# Patient Record
Sex: FEMALE | Race: WHITE | Employment: PART TIME | URBAN - METROPOLITAN AREA
[De-identification: names, ages, dates, MRNs, and addresses within clinical notes are randomized per-mention and may not be internally consistent; named-entity substitution may affect disease eponyms.]

---

## 2020-02-11 ENCOUNTER — OFFICE VISIT (OUTPATIENT)
Dept: FAMILY MEDICINE CLINIC | Age: 55
End: 2020-02-11

## 2020-02-11 VITALS
HEART RATE: 71 BPM | HEIGHT: 63 IN | BODY MASS INDEX: 26.4 KG/M2 | DIASTOLIC BLOOD PRESSURE: 80 MMHG | OXYGEN SATURATION: 96 % | RESPIRATION RATE: 14 BRPM | WEIGHT: 149 LBS | SYSTOLIC BLOOD PRESSURE: 124 MMHG | TEMPERATURE: 98.9 F

## 2020-02-11 PROCEDURE — 99213 OFFICE O/P EST LOW 20 MIN: CPT | Performed by: PHYSICIAN ASSISTANT

## 2020-02-11 RX ORDER — METHYLPREDNISOLONE 4 MG/1
TABLET ORAL
Qty: 1 KIT | Refills: 0 | Status: SHIPPED | OUTPATIENT
Start: 2020-02-11 | End: 2020-02-17

## 2020-02-11 NOTE — PROGRESS NOTES
Chief Complaint:   Rash (on face, since a year ago)    History of Present Illness   Source of history provided by:  patient. Christine Davila is a 47 y.o. old female who has a past medical history of: No past medical history on file. Presents to the walk in clinic for evaluation of a rash to the neck and face, which pt first noticed approximately a year ago. She states that the rash has been present on and off during that time. She was given Lotrisone which she states resolves the issue until she stops the cream. Nothing else makes it better or worse. Denies any known cause for the rash including any new soaps, detergents, lotions, foods, makeup, or medications. Reports associated erythema, mild burning, and pruritis. Denies any bleeding or drainage. Denies any lymphangitic streaking, fever, chills, HA , dyspnea, dysphagia, recent illness, myalgias, vomiting, or lethargy. ROS    Unless otherwise stated in this report or unable to obtain because of the patient's clinical or mental status as evidenced by the medical record, this patients's positive and negative responses for Review of Systems, constitutional, psych, eyes, ENT, cardiovascular, respiratory, gastrointestinal, neurological, genitourinary, musculoskeletal, integument systems and systems related to the presenting problem are either stated in the preceding or were not pertinent or were negative for the symptoms and/or complaints related to the medical problem. Past Surgical History:  has a past surgical history that includes fracture surgery; Breast surgery; and Tonsillectomy. Social History:  reports that she has been smoking. She has a 10.50 pack-year smoking history. She does not have any smokeless tobacco history on file. She reports that she does not drink alcohol. Family History: family history includes Cancer in her mother; Early Death in her father; Heart Disease in her father; High Blood Pressure in her mother.    Allergies: Pcn infection which were discussed. Pt is in agreement with this care plan. All questions answered.

## 2020-02-20 ENCOUNTER — OFFICE VISIT (OUTPATIENT)
Dept: FAMILY MEDICINE CLINIC | Age: 55
End: 2020-02-20

## 2020-02-20 ENCOUNTER — PATIENT MESSAGE (OUTPATIENT)
Dept: FAMILY MEDICINE CLINIC | Age: 55
End: 2020-02-20

## 2020-02-20 VITALS
DIASTOLIC BLOOD PRESSURE: 78 MMHG | HEIGHT: 63 IN | BODY MASS INDEX: 29.59 KG/M2 | SYSTOLIC BLOOD PRESSURE: 124 MMHG | TEMPERATURE: 98.2 F | OXYGEN SATURATION: 98 % | HEART RATE: 88 BPM | WEIGHT: 167 LBS

## 2020-02-20 PROCEDURE — 99214 OFFICE O/P EST MOD 30 MIN: CPT | Performed by: NURSE PRACTITIONER

## 2020-02-20 RX ORDER — CLINDAMYCIN HYDROCHLORIDE 300 MG/1
300 CAPSULE ORAL 3 TIMES DAILY
Qty: 30 CAPSULE | Refills: 1 | Status: SHIPPED
Start: 2020-02-20 | End: 2020-02-21 | Stop reason: SDUPTHER

## 2020-02-20 ASSESSMENT — PATIENT HEALTH QUESTIONNAIRE - PHQ9
SUM OF ALL RESPONSES TO PHQ9 QUESTIONS 1 & 2: 2
SUM OF ALL RESPONSES TO PHQ QUESTIONS 1-9: 2
1. LITTLE INTEREST OR PLEASURE IN DOING THINGS: 1
2. FEELING DOWN, DEPRESSED OR HOPELESS: 1
SUM OF ALL RESPONSES TO PHQ QUESTIONS 1-9: 2

## 2020-02-20 ASSESSMENT — ENCOUNTER SYMPTOMS
SORE THROAT: 0
COUGH: 0
DIARRHEA: 1
WHEEZING: 0
EYE PAIN: 0
NAUSEA: 0
SINUS PAIN: 0
COLOR CHANGE: 0
CONSTIPATION: 1
SHORTNESS OF BREATH: 0
BACK PAIN: 0
VOMITING: 0
CHEST TIGHTNESS: 0
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
## 2020-02-21 ENCOUNTER — TELEPHONE (OUTPATIENT)
Dept: FAMILY MEDICINE CLINIC | Age: 55
End: 2020-02-21

## 2020-02-21 RX ORDER — CLINDAMYCIN HYDROCHLORIDE 300 MG/1
300 CAPSULE ORAL 3 TIMES DAILY
Qty: 30 CAPSULE | Refills: 1 | Status: SHIPPED | OUTPATIENT
Start: 2020-02-21 | End: 2020-03-12

## 2020-02-21 NOTE — TELEPHONE ENCOUNTER
[facility] pharmacy told patient a shampoo was sent in and then cancelled. Patient is needing the cream for her rash. Patient was seen yesterday. Rash spread to face over night. Please advise.

## 2020-02-24 ENCOUNTER — PATIENT MESSAGE (OUTPATIENT)
Dept: FAMILY MEDICINE CLINIC | Age: 55
End: 2020-02-24

## 2020-02-24 NOTE — TELEPHONE ENCOUNTER
From: Silvia Finch  To: MARLYN Wang CNP  Sent: 2/24/2020 3:39 PM EST  Subject: Prescription Question    They did not fill the shampoo , they said it was canceled, and had no reason for cancelation     ----- Message -----  From: MARLYN Wang CNP  Sent: 2/24/20 3:21 PM  To: Silvia Finch  Subject: RE: Prescription Question    At the same time we sent in the Mupirocin cream (antiobiotic cream), Clindamycin (antibiotic pills) & the ketoconazole shampoo/wash (fungal that you will use like a body wash on that area) all to [facility] on [address], they gave a return receipt for 1:35 pm. I don't have any faxes from them, and I am going through my messages and do not see anything. Can you please follow up with them to make sure that they have since filled it? Sorry for the confusion, you were right in thinking what was sent over.     ----- Message -----   From: Silvia Finch   Sent: 2/20/2020 4:27 PM EST   To: MARLYN Wang CNP  Subject: Prescription Question    I'm not sure but I thought I was being prescribed a cream and a antibiotic and the pharmacy only had a prescription for clindatmycin,

## 2020-02-24 NOTE — TELEPHONE ENCOUNTER
Thank you for using [employer]. We have received your message and are currently  addressing your request.  We will get back to you as soon as possible. Thank you.

## 2020-02-24 NOTE — TELEPHONE ENCOUNTER
From: Arvin Khan  To: MARLYN Ortiz CNP  Sent: 2/20/2020 4:27 PM EST  Subject: Prescription Question    I'm not sure but I thought I was being prescribed a cream and a antibiotic and the pharmacy only had a prescription for clindatmycin,

## 2020-02-26 ENCOUNTER — TELEPHONE (OUTPATIENT)
Dept: FAMILY MEDICINE CLINIC | Age: 55
End: 2020-02-26

## 2020-02-26 NOTE — TELEPHONE ENCOUNTER
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

## 2020-06-04 ENCOUNTER — TELEPHONE (OUTPATIENT)
Dept: FAMILY MEDICINE CLINIC | Age: 55
End: 2020-06-04

## 2020-06-04 NOTE — TELEPHONE ENCOUNTER
That's an ER trip now, because she can't come here with those symptoms per protocol & she has to get lab work in case she is bleeding. Her electrolytes and all that are probably off.

## 2020-06-29 ENCOUNTER — TELEPHONE (OUTPATIENT)
Dept: FAMILY MEDICINE CLINIC | Age: 55
End: 2020-06-29

## 2020-06-30 NOTE — TELEPHONE ENCOUNTER
Those could be virus symptoms. We are not seeing any of that in our office.   Will need to go to flu clinic on [facility]